# Patient Record
Sex: MALE | Race: WHITE | Employment: FULL TIME | URBAN - METROPOLITAN AREA
[De-identification: names, ages, dates, MRNs, and addresses within clinical notes are randomized per-mention and may not be internally consistent; named-entity substitution may affect disease eponyms.]

---

## 2018-11-21 ENCOUNTER — APPOINTMENT (OUTPATIENT)
Dept: CT IMAGING | Age: 28
DRG: 885 | End: 2018-11-21
Payer: COMMERCIAL

## 2018-11-21 ENCOUNTER — HOSPITAL ENCOUNTER (INPATIENT)
Age: 28
LOS: 7 days | Discharge: HOME | DRG: 885 | End: 2018-11-28
Attending: EMERGENCY MEDICINE | Admitting: PSYCHIATRY & NEUROLOGY
Payer: COMMERCIAL

## 2018-11-21 DIAGNOSIS — F31.13 BIPOLAR AFFECTIVE DISORDER, MANIC, SEVERE (HCC): ICD-10-CM

## 2018-11-21 DIAGNOSIS — F23 ACUTE PSYCHOSIS (HCC): Primary | ICD-10-CM

## 2018-11-21 PROBLEM — F29 PSYCHOSIS ASSOCIATED WITH INTENSIVE CARE (HCC): Status: ACTIVE | Noted: 2018-11-21

## 2018-11-21 LAB
A/G RATIO: 1.8 (ref 1.1–2.2)
ACETAMINOPHEN LEVEL: <5 UG/ML (ref 10–30)
ALBUMIN SERPL-MCNC: 4.9 G/DL (ref 3.4–5)
ALP BLD-CCNC: 96 U/L (ref 40–129)
ALT SERPL-CCNC: 25 U/L (ref 10–40)
AMPHETAMINE SCREEN, URINE: NORMAL
ANION GAP SERPL CALCULATED.3IONS-SCNC: 14 MMOL/L (ref 3–16)
AST SERPL-CCNC: 26 U/L (ref 15–37)
BARBITURATE SCREEN URINE: NORMAL
BASOPHILS ABSOLUTE: 0.1 K/UL (ref 0–0.2)
BASOPHILS RELATIVE PERCENT: 0.7 %
BENZODIAZEPINE SCREEN, URINE: NORMAL
BILIRUB SERPL-MCNC: 0.8 MG/DL (ref 0–1)
BILIRUBIN URINE: NEGATIVE
BLOOD, URINE: NEGATIVE
BUN BLDV-MCNC: 9 MG/DL (ref 7–20)
CALCIUM SERPL-MCNC: 10.1 MG/DL (ref 8.3–10.6)
CANNABINOID SCREEN URINE: NORMAL
CHLORIDE BLD-SCNC: 98 MMOL/L (ref 99–110)
CLARITY: CLEAR
CO2: 25 MMOL/L (ref 21–32)
COCAINE METABOLITE SCREEN URINE: NORMAL
COLOR: YELLOW
CREAT SERPL-MCNC: 0.9 MG/DL (ref 0.9–1.3)
EOSINOPHILS ABSOLUTE: 0 K/UL (ref 0–0.6)
EOSINOPHILS RELATIVE PERCENT: 0.3 %
ETHANOL: NORMAL MG/DL (ref 0–0.08)
GFR AFRICAN AMERICAN: >60
GFR NON-AFRICAN AMERICAN: >60
GLOBULIN: 2.7 G/DL
GLUCOSE BLD-MCNC: 92 MG/DL (ref 70–99)
GLUCOSE URINE: NEGATIVE MG/DL
HCT VFR BLD CALC: 44.6 % (ref 40.5–52.5)
HEMOGLOBIN: 15.4 G/DL (ref 13.5–17.5)
KETONES, URINE: >=80 MG/DL
LEUKOCYTE ESTERASE, URINE: NEGATIVE
LYMPHOCYTES ABSOLUTE: 3.2 K/UL (ref 1–5.1)
LYMPHOCYTES RELATIVE PERCENT: 24.7 %
Lab: NORMAL
MCH RBC QN AUTO: 30.2 PG (ref 26–34)
MCHC RBC AUTO-ENTMCNC: 34.4 G/DL (ref 31–36)
MCV RBC AUTO: 87.9 FL (ref 80–100)
METHADONE SCREEN, URINE: NORMAL
MICROSCOPIC EXAMINATION: ABNORMAL
MONOCYTES ABSOLUTE: 1.1 K/UL (ref 0–1.3)
MONOCYTES RELATIVE PERCENT: 8.2 %
NEUTROPHILS ABSOLUTE: 8.6 K/UL (ref 1.7–7.7)
NEUTROPHILS RELATIVE PERCENT: 66.1 %
NITRITE, URINE: NEGATIVE
OPIATE SCREEN URINE: NORMAL
OXYCODONE URINE: NORMAL
PDW BLD-RTO: 13.2 % (ref 12.4–15.4)
PH UA: 7
PH UA: 7
PHENCYCLIDINE SCREEN URINE: NORMAL
PLATELET # BLD: 256 K/UL (ref 135–450)
PMV BLD AUTO: 9.4 FL (ref 5–10.5)
POTASSIUM SERPL-SCNC: 4.1 MMOL/L (ref 3.5–5.1)
PROPOXYPHENE SCREEN: NORMAL
PROTEIN UA: NEGATIVE MG/DL
RBC # BLD: 5.08 M/UL (ref 4.2–5.9)
SALICYLATE, SERUM: 0.5 MG/DL (ref 15–30)
SODIUM BLD-SCNC: 137 MMOL/L (ref 136–145)
SPECIFIC GRAVITY UA: <=1.005
TOTAL PROTEIN: 7.6 G/DL (ref 6.4–8.2)
URINE REFLEX TO CULTURE: ABNORMAL
URINE TYPE: ABNORMAL
UROBILINOGEN, URINE: 0.2 E.U./DL
WBC # BLD: 13 K/UL (ref 4–11)

## 2018-11-21 PROCEDURE — 6360000002 HC RX W HCPCS: Performed by: EMERGENCY MEDICINE

## 2018-11-21 PROCEDURE — G0480 DRUG TEST DEF 1-7 CLASSES: HCPCS

## 2018-11-21 PROCEDURE — 99285 EMERGENCY DEPT VISIT HI MDM: CPT

## 2018-11-21 PROCEDURE — 81003 URINALYSIS AUTO W/O SCOPE: CPT

## 2018-11-21 PROCEDURE — 6370000000 HC RX 637 (ALT 250 FOR IP): Performed by: EMERGENCY MEDICINE

## 2018-11-21 PROCEDURE — 1240000000 HC EMOTIONAL WELLNESS R&B

## 2018-11-21 PROCEDURE — 80053 COMPREHEN METABOLIC PANEL: CPT

## 2018-11-21 PROCEDURE — 85025 COMPLETE CBC W/AUTO DIFF WBC: CPT

## 2018-11-21 PROCEDURE — 70450 CT HEAD/BRAIN W/O DYE: CPT

## 2018-11-21 PROCEDURE — 80307 DRUG TEST PRSMV CHEM ANLYZR: CPT

## 2018-11-21 RX ORDER — LORAZEPAM 2 MG/ML
2 INJECTION INTRAMUSCULAR ONCE
Status: COMPLETED | OUTPATIENT
Start: 2018-11-21 | End: 2018-11-21

## 2018-11-21 RX ORDER — TRAZODONE HYDROCHLORIDE 50 MG/1
50 TABLET ORAL NIGHTLY PRN
Status: DISCONTINUED | OUTPATIENT
Start: 2018-11-21 | End: 2018-11-28 | Stop reason: HOSPADM

## 2018-11-21 RX ORDER — NICOTINE 21 MG/24HR
1 PATCH, TRANSDERMAL 24 HOURS TRANSDERMAL DAILY
Status: DISCONTINUED | OUTPATIENT
Start: 2018-11-21 | End: 2018-11-21

## 2018-11-21 RX ORDER — BISACODYL 10 MG
10 SUPPOSITORY, RECTAL RECTAL DAILY PRN
Status: DISCONTINUED | OUTPATIENT
Start: 2018-11-21 | End: 2018-11-28 | Stop reason: HOSPADM

## 2018-11-21 RX ORDER — IBUPROFEN 400 MG/1
400 TABLET ORAL EVERY 6 HOURS PRN
Status: DISCONTINUED | OUTPATIENT
Start: 2018-11-21 | End: 2018-11-28 | Stop reason: HOSPADM

## 2018-11-21 RX ORDER — OLANZAPINE 5 MG/1
5 TABLET, ORALLY DISINTEGRATING ORAL NIGHTLY
Status: DISCONTINUED | OUTPATIENT
Start: 2018-11-21 | End: 2018-11-21

## 2018-11-21 RX ORDER — OLANZAPINE 5 MG/1
10 TABLET, ORALLY DISINTEGRATING ORAL ONCE
Status: COMPLETED | OUTPATIENT
Start: 2018-11-21 | End: 2018-11-21

## 2018-11-21 RX ORDER — NICOTINE 21 MG/24HR
1 PATCH, TRANSDERMAL 24 HOURS TRANSDERMAL DAILY
Status: DISCONTINUED | OUTPATIENT
Start: 2018-11-22 | End: 2018-11-28 | Stop reason: HOSPADM

## 2018-11-21 RX ORDER — ACETAMINOPHEN 325 MG/1
650 TABLET ORAL EVERY 4 HOURS PRN
Status: DISCONTINUED | OUTPATIENT
Start: 2018-11-21 | End: 2018-11-28 | Stop reason: HOSPADM

## 2018-11-21 RX ORDER — HYDROXYZINE PAMOATE 50 MG/1
50 CAPSULE ORAL 3 TIMES DAILY PRN
Status: DISCONTINUED | OUTPATIENT
Start: 2018-11-21 | End: 2018-11-28 | Stop reason: HOSPADM

## 2018-11-21 RX ORDER — HALOPERIDOL 5 MG/ML
5 INJECTION INTRAMUSCULAR EVERY 6 HOURS PRN
Status: DISCONTINUED | OUTPATIENT
Start: 2018-11-21 | End: 2018-11-23

## 2018-11-21 RX ORDER — MAGNESIUM HYDROXIDE/ALUMINUM HYDROXICE/SIMETHICONE 120; 1200; 1200 MG/30ML; MG/30ML; MG/30ML
30 SUSPENSION ORAL EVERY 6 HOURS PRN
Status: DISCONTINUED | OUTPATIENT
Start: 2018-11-21 | End: 2018-11-28 | Stop reason: HOSPADM

## 2018-11-21 RX ADMIN — OLANZAPINE 10 MG: 5 TABLET, ORALLY DISINTEGRATING ORAL at 18:34

## 2018-11-21 RX ADMIN — LORAZEPAM 2 MG: 2 INJECTION INTRAMUSCULAR; INTRAVENOUS at 19:56

## 2018-11-21 ASSESSMENT — SLEEP AND FATIGUE QUESTIONNAIRES
DIFFICULTY FALLING ASLEEP: YES
DO YOU USE A SLEEP AID: NO
DO YOU HAVE DIFFICULTY SLEEPING: YES
DIFFICULTY ARISING: NO
RESTFUL SLEEP: NO
DIFFICULTY STAYING ASLEEP: YES
SLEEP PATTERN: DIFFICULTY FALLING ASLEEP;DISTURBED/INTERRUPTED SLEEP
AVERAGE NUMBER OF SLEEP HOURS: 1

## 2018-11-21 NOTE — ED PROVIDER NOTES
Physician who either signs orCo-signs this chart in the absence of a cardiologist.  Please see their note for interpretation of EKG. RADIOLOGY:   Non-plain film images such as CT, Ultrasound and MRI are read by the radiologist. Cedric Cherie radiographic images are visualized andpreliminarily interpreted by the  ED Provider with the below findings:        Interpretation perthe Radiologist below, if available at the time of this note:    CT Head WO Contrast   Final Result   No acute intracranial abnormality. Ct Head Wo Contrast    Result Date: 11/21/2018  EXAMINATION: CT OF THE HEAD WITHOUT CONTRAST  11/21/2018 3:04 pm TECHNIQUE: CT of the head was performed without the administration of intravenous contrast. Dose modulation, iterative reconstruction, and/or weight based adjustment of the mA/kV was utilized to reduce the radiation dose to as low as reasonably achievable. COMPARISON: None. HISTORY: ORDERING SYSTEM PROVIDED HISTORY: manic, flight of ideas, no psyciatric history TECHNOLOGIST PROVIDED HISTORY: If patient is on cardiac monitor and/or pulse ox, they may be taken off cardiac monitor and pulse ox, left on O2 if currently on. All monitors reattached when patient returns to room. Has a \"code stroke\" or \"stroke alert\" been called? ->No Ordering Physician Provided Reason for Exam: manic, flight of ideas FINDINGS: BRAIN/VENTRICLES: There is no acute intracranial hemorrhage, mass effect or midline shift. No abnormal extra-axial fluid collection. The gray-white differentiation is maintained without evidence of an acute infarct. There is no evidence of hydrocephalus. ORBITS: The visualized portion of the orbits demonstrate no acute abnormality. SINUSES: The visualized paranasal sinuses and mastoid air cells demonstrate no acute abnormality. SOFT TISSUES/SKULL:  No acute abnormality of the visualized skull or soft tissues. No acute intracranial abnormality.          PROCEDURES   Unless otherwise noted

## 2018-11-22 PROBLEM — Z72.0 TOBACCO ABUSE: Status: ACTIVE | Noted: 2018-11-22

## 2018-11-22 PROBLEM — F31.13 BIPOLAR AFFECTIVE DISORDER, MANIC, SEVERE (HCC): Status: ACTIVE | Noted: 2018-11-21

## 2018-11-22 PROBLEM — D72.829 LEUCOCYTOSIS: Status: ACTIVE | Noted: 2018-11-22

## 2018-11-22 PROBLEM — F23 ACUTE PSYCHOSIS (HCC): Status: ACTIVE | Noted: 2018-11-21

## 2018-11-22 PROBLEM — F12.10 CANNABIS ABUSE: Status: ACTIVE | Noted: 2018-11-22

## 2018-11-22 PROCEDURE — 99222 1ST HOSP IP/OBS MODERATE 55: CPT | Performed by: PHYSICIAN ASSISTANT

## 2018-11-22 PROCEDURE — 90792 PSYCH DIAG EVAL W/MED SRVCS: CPT | Performed by: NURSE PRACTITIONER

## 2018-11-22 PROCEDURE — 6370000000 HC RX 637 (ALT 250 FOR IP): Performed by: NURSE PRACTITIONER

## 2018-11-22 PROCEDURE — 6370000000 HC RX 637 (ALT 250 FOR IP): Performed by: PSYCHIATRY & NEUROLOGY

## 2018-11-22 PROCEDURE — 1240000000 HC EMOTIONAL WELLNESS R&B

## 2018-11-22 RX ORDER — DIVALPROEX SODIUM 500 MG/1
500 TABLET, EXTENDED RELEASE ORAL NIGHTLY
Status: DISCONTINUED | OUTPATIENT
Start: 2018-11-22 | End: 2018-11-26

## 2018-11-22 RX ORDER — OLANZAPINE 5 MG/1
5 TABLET, ORALLY DISINTEGRATING ORAL 2 TIMES DAILY PRN
Status: DISCONTINUED | OUTPATIENT
Start: 2018-11-22 | End: 2018-11-23

## 2018-11-22 RX ORDER — OLANZAPINE 5 MG/1
10 TABLET, ORALLY DISINTEGRATING ORAL NIGHTLY
Status: DISCONTINUED | OUTPATIENT
Start: 2018-11-22 | End: 2018-11-27

## 2018-11-22 RX ADMIN — NICOTINE POLACRILEX 2 MG: 2 GUM, CHEWING BUCCAL at 11:35

## 2018-11-22 RX ADMIN — OLANZAPINE 5 MG: 5 TABLET, ORALLY DISINTEGRATING ORAL at 23:33

## 2018-11-22 RX ADMIN — OLANZAPINE 10 MG: 5 TABLET, ORALLY DISINTEGRATING ORAL at 19:47

## 2018-11-22 RX ADMIN — NICOTINE POLACRILEX 2 MG: 2 GUM, CHEWING BUCCAL at 16:17

## 2018-11-22 RX ADMIN — NICOTINE POLACRILEX 2 MG: 2 GUM, CHEWING BUCCAL at 14:39

## 2018-11-22 RX ADMIN — DIVALPROEX SODIUM 500 MG: 500 TABLET, EXTENDED RELEASE ORAL at 19:45

## 2018-11-22 RX ADMIN — NICOTINE POLACRILEX 2 MG: 2 GUM, CHEWING BUCCAL at 23:36

## 2018-11-22 NOTE — BH NOTE
This writer spoke with mother of pt for collateral. Mother states that pt has been very driven at work and is the  of the [facility], [facility] [address] [place] [address]. Pt recently returned from a conference where he was presented with 3 awards for his work. Pt's girlfriend told mother that pt has seemed \"manic\" since his return, and has not been sleeping. Pt had to leave work due to altered mental status. Mother describes his son as very driven and goal oriented, and this is not like him. Mother states that gf did not reject proposal but told pt that they would talk about it once he was better.

## 2018-11-22 NOTE — H&P
disorder         Scheduled Meds   OLANZapine zydis  10 mg Oral Nightly    divalproex  500 mg Oral Nightly    nicotine  1 patch Transdermal Daily       PRN Meds  OLANZapine zydis, acetaminophen, ibuprofen, hydrOXYzine, haloperidol lactate, traZODone, magnesium hydroxide, bisacodyl, aluminum & magnesium hydroxide-simethicone, nicotine polacrilex    OBJECTIVE  Vital Signs:  Vitals:    11/21/18 2020   Pulse: 78   Resp: 15   SpO2:        Labs:  Recent Results (from the past 24 hour(s))   CBC Auto Differential    Collection Time: 11/21/18  3:14 PM   Result Value Ref Range    WBC 13.0 (H) 4.0 - 11.0 K/uL    RBC 5.08 4.20 - 5.90 M/uL    Hemoglobin 15.4 13.5 - 17.5 g/dL    Hematocrit 44.6 40.5 - 52.5 %    MCV 87.9 80.0 - 100.0 fL    MCH 30.2 26.0 - 34.0 pg    MCHC 34.4 31.0 - 36.0 g/dL    RDW 13.2 12.4 - 15.4 %    Platelets 550 989 - 629 K/uL    MPV 9.4 5.0 - 10.5 fL    Neutrophils % 66.1 %    Lymphocytes % 24.7 %    Monocytes % 8.2 %    Eosinophils % 0.3 %    Basophils % 0.7 %    Neutrophils # 8.6 (H) 1.7 - 7.7 K/uL    Lymphocytes # 3.2 1.0 - 5.1 K/uL    Monocytes # 1.1 0.0 - 1.3 K/uL    Eosinophils # 0.0 0.0 - 0.6 K/uL    Basophils # 0.1 0.0 - 0.2 K/uL   Comprehensive Metabolic Panel    Collection Time: 11/21/18  3:14 PM   Result Value Ref Range    Sodium 137 136 - 145 mmol/L    Potassium 4.1 3.5 - 5.1 mmol/L    Chloride 98 (L) 99 - 110 mmol/L    CO2 25 21 - 32 mmol/L    Anion Gap 14 3 - 16    Glucose 92 70 - 99 mg/dL    BUN 9 7 - 20 mg/dL    CREATININE 0.9 0.9 - 1.3 mg/dL    GFR Non-African American >60 >60    GFR African American >60 >60    Calcium 10.1 8.3 - 10.6 mg/dL    Total Protein 7.6 6.4 - 8.2 g/dL    Alb 4.9 3.4 - 5.0 g/dL    Albumin/Globulin Ratio 1.8 1.1 - 2.2    Total Bilirubin 0.8 0.0 - 1.0 mg/dL    Alkaline Phosphatase 96 40 - 129 U/L    ALT 25 10 - 40 U/L    AST 26 15 - 37 U/L    Globulin 2.7 g/dL   Acetaminophen Level    Collection Time: 11/21/18  3:14 PM   Result Value Ref Range    Acetaminophen Level

## 2018-11-23 PROCEDURE — 6370000000 HC RX 637 (ALT 250 FOR IP): Performed by: NURSE PRACTITIONER

## 2018-11-23 PROCEDURE — 6370000000 HC RX 637 (ALT 250 FOR IP): Performed by: PSYCHIATRY & NEUROLOGY

## 2018-11-23 PROCEDURE — 93005 ELECTROCARDIOGRAM TRACING: CPT | Performed by: PSYCHIATRY & NEUROLOGY

## 2018-11-23 PROCEDURE — 1240000000 HC EMOTIONAL WELLNESS R&B

## 2018-11-23 PROCEDURE — 6360000002 HC RX W HCPCS: Performed by: PSYCHIATRY & NEUROLOGY

## 2018-11-23 RX ORDER — BENZTROPINE MESYLATE 1 MG/ML
1 INJECTION INTRAMUSCULAR; INTRAVENOUS EVERY 6 HOURS PRN
Status: DISCONTINUED | OUTPATIENT
Start: 2018-11-23 | End: 2018-11-28 | Stop reason: HOSPADM

## 2018-11-23 RX ORDER — HALOPERIDOL 5 MG/ML
10 INJECTION INTRAMUSCULAR EVERY 6 HOURS PRN
Status: DISCONTINUED | OUTPATIENT
Start: 2018-11-23 | End: 2018-11-28 | Stop reason: HOSPADM

## 2018-11-23 RX ORDER — OLANZAPINE 10 MG/1
10 TABLET ORAL EVERY 6 HOURS PRN
Status: DISCONTINUED | OUTPATIENT
Start: 2018-11-23 | End: 2018-11-28 | Stop reason: HOSPADM

## 2018-11-23 RX ORDER — OLANZAPINE 5 MG/1
5 TABLET ORAL ONCE
Status: COMPLETED | OUTPATIENT
Start: 2018-11-23 | End: 2018-11-23

## 2018-11-23 RX ORDER — LORAZEPAM 2 MG/ML
2 INJECTION INTRAMUSCULAR EVERY 6 HOURS PRN
Status: DISCONTINUED | OUTPATIENT
Start: 2018-11-23 | End: 2018-11-28 | Stop reason: HOSPADM

## 2018-11-23 RX ADMIN — NICOTINE POLACRILEX 2 MG: 2 GUM, CHEWING BUCCAL at 16:36

## 2018-11-23 RX ADMIN — HALOPERIDOL LACTATE 5 MG: 5 INJECTION, SOLUTION INTRAMUSCULAR at 00:05

## 2018-11-23 RX ADMIN — DIVALPROEX SODIUM 500 MG: 500 TABLET, EXTENDED RELEASE ORAL at 20:05

## 2018-11-23 RX ADMIN — NICOTINE POLACRILEX 2 MG: 2 GUM, CHEWING BUCCAL at 11:06

## 2018-11-23 RX ADMIN — NICOTINE POLACRILEX 2 MG: 2 GUM, CHEWING BUCCAL at 19:38

## 2018-11-23 RX ADMIN — OLANZAPINE 10 MG: 10 TABLET, FILM COATED ORAL at 22:24

## 2018-11-23 RX ADMIN — OLANZAPINE 5 MG: 5 TABLET, ORALLY DISINTEGRATING ORAL at 10:38

## 2018-11-23 RX ADMIN — HYDROXYZINE PAMOATE 50 MG: 50 CAPSULE ORAL at 15:32

## 2018-11-23 RX ADMIN — HYDROXYZINE PAMOATE 50 MG: 50 CAPSULE ORAL at 20:05

## 2018-11-23 RX ADMIN — NICOTINE POLACRILEX 2 MG: 2 GUM, CHEWING BUCCAL at 23:08

## 2018-11-23 RX ADMIN — OLANZAPINE 10 MG: 5 TABLET, ORALLY DISINTEGRATING ORAL at 20:05

## 2018-11-23 RX ADMIN — OLANZAPINE 5 MG: 5 TABLET, FILM COATED ORAL at 11:30

## 2018-11-23 ASSESSMENT — SLEEP AND FATIGUE QUESTIONNAIRES
DIFFICULTY STAYING ASLEEP: YES
DO YOU HAVE DIFFICULTY SLEEPING: YES
DIFFICULTY ARISING: YES
DIFFICULTY FALLING ASLEEP: YES
SLEEP PATTERN: DIFFICULTY FALLING ASLEEP;DISTURBED/INTERRUPTED SLEEP;INSOMNIA;RESTLESSNESS
AVERAGE NUMBER OF SLEEP HOURS: 5
RESTFUL SLEEP: NO
DO YOU USE A SLEEP AID: YES

## 2018-11-23 ASSESSMENT — LIFESTYLE VARIABLES: HISTORY_ALCOHOL_USE: YES

## 2018-11-23 ASSESSMENT — PATIENT HEALTH QUESTIONNAIRE - PHQ9: SUM OF ALL RESPONSES TO PHQ QUESTIONS 1-9: 13

## 2018-11-23 NOTE — BH NOTE
Pt continues to intrude into another peer's personal space despite having the peer herself respectfully tell him to stay away from her and allow her her personal space. Pt continues to get agitated, is now cursing at staff, crying, stating that he is not sleeping another night on unit. Pt will no longer redirect from going room to room and writing on pt's room signs. When writer attempts to redirect pt, pt puts his hand up to writer and states \"I know that I am not suppose to do this\" yet continued behavior. Will continue to monitor.

## 2018-11-23 NOTE — PROGRESS NOTES
Time: [id number]      Type of Group: relaxation & recreation      Level of Participation: did not stay for all of group.

## 2018-11-23 NOTE — BH NOTE
Patient approached desk tapping his chest stating that he was having chest pain. He stated \" I need an EKG\". Order obtained from dr Sylvia Jiménez. Vital signs evaluated by charge nurse.

## 2018-11-24 LAB
EKG ATRIAL RATE: 81 BPM
EKG DIAGNOSIS: NORMAL
EKG P AXIS: 45 DEGREES
EKG P-R INTERVAL: 144 MS
EKG Q-T INTERVAL: 370 MS
EKG QRS DURATION: 90 MS
EKG QTC CALCULATION (BAZETT): 429 MS
EKG R AXIS: 46 DEGREES
EKG T AXIS: 17 DEGREES
EKG VENTRICULAR RATE: 81 BPM

## 2018-11-24 PROCEDURE — 93010 ELECTROCARDIOGRAM REPORT: CPT | Performed by: INTERNAL MEDICINE

## 2018-11-24 PROCEDURE — 6370000000 HC RX 637 (ALT 250 FOR IP): Performed by: PSYCHIATRY & NEUROLOGY

## 2018-11-24 PROCEDURE — 99233 SBSQ HOSP IP/OBS HIGH 50: CPT | Performed by: PSYCHIATRY & NEUROLOGY

## 2018-11-24 PROCEDURE — 6370000000 HC RX 637 (ALT 250 FOR IP): Performed by: NURSE PRACTITIONER

## 2018-11-24 PROCEDURE — 1240000000 HC EMOTIONAL WELLNESS R&B

## 2018-11-24 RX ADMIN — NICOTINE POLACRILEX 2 MG: 2 GUM, CHEWING BUCCAL at 15:50

## 2018-11-24 RX ADMIN — OLANZAPINE 10 MG: 10 TABLET, FILM COATED ORAL at 18:32

## 2018-11-24 RX ADMIN — OLANZAPINE 10 MG: 10 TABLET, FILM COATED ORAL at 09:16

## 2018-11-24 RX ADMIN — HYDROXYZINE PAMOATE 50 MG: 50 CAPSULE ORAL at 16:13

## 2018-11-24 RX ADMIN — NICOTINE POLACRILEX 2 MG: 2 GUM, CHEWING BUCCAL at 18:19

## 2018-11-24 RX ADMIN — HYDROXYZINE PAMOATE 50 MG: 50 CAPSULE ORAL at 09:16

## 2018-11-24 RX ADMIN — NICOTINE POLACRILEX 2 MG: 2 GUM, CHEWING BUCCAL at 09:16

## 2018-11-24 NOTE — BH NOTE
Client is extremely intense at times. Reports significant irritability. Client is short with staff, poor eye contact. Evasive.  Walks off while writer is attempting to talk with client     PRN medications given to client: zyprexa 10 mg

## 2018-11-24 NOTE — PLAN OF CARE
Problem: Altered Mood, Manic Behavior:  Goal: Able to verbalize decrease in frequency and intensity of racing thoughts  Able to verbalize decrease in frequency and intensity of racing thoughts   Outcome: Ongoing                                                                      Group Therapy Note    Date: 11/24/2018  Start Time: 1:30pm  End Time:  3:00pm  Number of Participants: 8    Type of Group: Cognitive Skills    Patient's Goal:  To learn about the use of guided imagery to increase self-management of emotions. Notes:  Pt attempted several times to remain in group, but was disruptive and intrusive while others were trying to focus and speak. Pt left group.     Status After Intervention:  Unchanged    Participation Level: Minimal    Participation Quality: Inappropriate, Intrusive and Resistant      Speech:  pressured      Thought Process/Content: Perseverating      Affective Functioning: Flat      Mood: anxious and irritable      Level of consciousness:  Alert and Inattentive      Response to Learning: Resistant      Endings: None Reported    Modes of Intervention: Education, Support, Socialization, Exploration and Activity      Discipline Responsible: /Counselor      Signature:  RANDY Rob

## 2018-11-25 LAB
EKG ATRIAL RATE: 63 BPM
EKG DIAGNOSIS: NORMAL
EKG P AXIS: 32 DEGREES
EKG P-R INTERVAL: 142 MS
EKG Q-T INTERVAL: 386 MS
EKG QRS DURATION: 90 MS
EKG QTC CALCULATION (BAZETT): 395 MS
EKG R AXIS: 61 DEGREES
EKG T AXIS: 22 DEGREES
EKG VENTRICULAR RATE: 63 BPM
TOTAL CK: 164 U/L (ref 39–308)
TROPONIN: <0.01 NG/ML

## 2018-11-25 PROCEDURE — 36415 COLL VENOUS BLD VENIPUNCTURE: CPT

## 2018-11-25 PROCEDURE — 1240000000 HC EMOTIONAL WELLNESS R&B

## 2018-11-25 PROCEDURE — 6370000000 HC RX 637 (ALT 250 FOR IP): Performed by: NURSE PRACTITIONER

## 2018-11-25 PROCEDURE — 93005 ELECTROCARDIOGRAM TRACING: CPT | Performed by: PSYCHIATRY & NEUROLOGY

## 2018-11-25 PROCEDURE — 93010 ELECTROCARDIOGRAM REPORT: CPT | Performed by: INTERNAL MEDICINE

## 2018-11-25 PROCEDURE — 84484 ASSAY OF TROPONIN QUANT: CPT

## 2018-11-25 PROCEDURE — 6370000000 HC RX 637 (ALT 250 FOR IP): Performed by: PSYCHIATRY & NEUROLOGY

## 2018-11-25 PROCEDURE — 82550 ASSAY OF CK (CPK): CPT

## 2018-11-25 RX ORDER — LORAZEPAM 2 MG/1
2 TABLET ORAL ONCE
Status: DISCONTINUED | OUTPATIENT
Start: 2018-11-25 | End: 2018-11-25

## 2018-11-25 RX ORDER — LORAZEPAM 1 MG/1
TABLET ORAL
Status: DISPENSED
Start: 2018-11-25 | End: 2018-11-25

## 2018-11-25 RX ADMIN — HYDROXYZINE PAMOATE 50 MG: 50 CAPSULE ORAL at 09:32

## 2018-11-25 RX ADMIN — LORAZEPAM 2 MG: 2 TABLET ORAL at 10:45

## 2018-11-25 RX ADMIN — OLANZAPINE 10 MG: 5 TABLET, ORALLY DISINTEGRATING ORAL at 20:27

## 2018-11-25 RX ADMIN — NICOTINE POLACRILEX 2 MG: 2 GUM, CHEWING BUCCAL at 20:28

## 2018-11-25 RX ADMIN — TRAZODONE HYDROCHLORIDE 50 MG: 50 TABLET ORAL at 21:33

## 2018-11-25 RX ADMIN — NICOTINE POLACRILEX 2 MG: 2 GUM, CHEWING BUCCAL at 12:47

## 2018-11-25 RX ADMIN — DIVALPROEX SODIUM 500 MG: 500 TABLET, EXTENDED RELEASE ORAL at 20:27

## 2018-11-25 ASSESSMENT — ENCOUNTER SYMPTOMS
NAUSEA: 0
ABDOMINAL PAIN: 0
BACK PAIN: 0
SHORTNESS OF BREATH: 0
CHEST TIGHTNESS: 1
DIARRHEA: 0
CONSTIPATION: 0
SORE THROAT: 0

## 2018-11-25 NOTE — BH NOTE
Holding chest \"boy I can feel that\", \"that arrhythmia, I can feel it\". New order received. Received Ativan 2 mg PO for anxiety. Lab here to draw cardiac workup. Increased nervousness \"I hate needles\". Staff at side for assurance. Informed of need for EKG.

## 2018-11-25 NOTE — PLAN OF CARE
Problem: Altered Mood, Manic Behavior:  Goal: Ability to demonstrate self-control will improve  Ability to demonstrate self-control will improve   Outcome: Ongoing                                                                      Group Therapy Note    Date: 11/25/2018  Start Time: 10:45 am  End Time:  12:00 pm  Number of Participants: 8    Type of Group: Psychoeducation    Patient's Goal:  Patient was invited to participate in art therapy group on gratitude. Patients received and reviewed a handout on how to practice and the benefits of practicing gratitude to promote mental health wellness and were invited to create an art piece expressing gratitude in their lives. Notes:  Sander Rizvi entered and exited the room on multiple occassions and when in group, would read the handout on gratitude and engage peers in appropriate conversation. Sander Rizvi appeared more calm and had better self control than had been noted in yesterday's art therapy group. Status After Intervention:  Improved    Participation Level:  Active Listener and Interactive    Participation Quality: Appropriate, Attentive and Sharing      Speech:  pressured and loud      Thought Process/Content: Flight of ideas      Affective Functioning: Constricted/Restricted      Mood: anxious      Level of consciousness:  Alert and Preoccupied      Response to Learning: Able to verbalize current knowledge/experience, Able to verbalize/acknowledge new learning, Able to retain information and Capable of insight      Endings: None Reported    Modes of Intervention: Education, Support, Socialization and Exploration      Discipline Responsible: Psychoeducational Specialist      Signature:  Ki Crystal

## 2018-11-25 NOTE — BH NOTE
Patients grandmother called and was worried about patient and said she was told by patients mother that she and patients father can't visit patient. She stated that she just wanted to know if that's true because she thinks that Tania Montenegro mother is trying to keep this a secret as he is involved in a court lopez regarding his kids with their mother. Jenniffer Luong also reported that patient had a \"bad drug problem\" in the past and spent time as a psychiatric inpatient at a hospital in [place]. The grandmother also reported that she just wants to know if they can visit Ghada Hines, as he has called her and his dad since his admission. Writer informed her that she could not give grandmother any information, but would pass on the information that she gave.

## 2018-11-26 PROCEDURE — 1240000000 HC EMOTIONAL WELLNESS R&B

## 2018-11-26 PROCEDURE — 6370000000 HC RX 637 (ALT 250 FOR IP): Performed by: PSYCHIATRY & NEUROLOGY

## 2018-11-26 PROCEDURE — 99233 SBSQ HOSP IP/OBS HIGH 50: CPT | Performed by: NURSE PRACTITIONER

## 2018-11-26 PROCEDURE — 6370000000 HC RX 637 (ALT 250 FOR IP): Performed by: NURSE PRACTITIONER

## 2018-11-26 RX ORDER — DIVALPROEX SODIUM 500 MG/1
1000 TABLET, EXTENDED RELEASE ORAL NIGHTLY
Status: DISCONTINUED | OUTPATIENT
Start: 2018-11-26 | End: 2018-11-28 | Stop reason: HOSPADM

## 2018-11-26 RX ADMIN — HYDROXYZINE PAMOATE 50 MG: 50 CAPSULE ORAL at 08:43

## 2018-11-26 RX ADMIN — DIVALPROEX SODIUM 1000 MG: 500 TABLET, EXTENDED RELEASE ORAL at 20:12

## 2018-11-26 RX ADMIN — NICOTINE POLACRILEX 2 MG: 2 GUM, CHEWING BUCCAL at 19:26

## 2018-11-26 RX ADMIN — HYDROXYZINE PAMOATE 50 MG: 50 CAPSULE ORAL at 14:46

## 2018-11-26 RX ADMIN — OLANZAPINE 10 MG: 10 TABLET, FILM COATED ORAL at 12:49

## 2018-11-26 RX ADMIN — OLANZAPINE 10 MG: 5 TABLET, ORALLY DISINTEGRATING ORAL at 20:12

## 2018-11-26 NOTE — PROGRESS NOTES
Psychiatric Provider Progress Note    Feliberto Fernandez  [id number]    Hospital Day: 5    Active Problem List  Active Hospital Problems    Diagnosis Date Noted    Tobacco abuse [Z72.0] 11/22/2018    Cannabis abuse [F12.10] 11/22/2018    Leucocytosis [D72.829] 11/22/2018    Acute psychosis ([facility] [F23]     Bipolar affective disorder, manic, severe ([facility] [F31.13] 11/21/2018       Reason for Admission  Context: New Onset of Illness  Location: mood  Duration: 5 days. Severity on Admission: severe  Associated Symptoms on Admission: insomnia, manic, hyperactive, PRINCESS, rapid, pressured speech, disorganization, increased salience, animated, increased goal directed activity at work (won [employer] of the year last week), intense affect  Modifying Factors: proposed to GF and she declined, no hx of mood symptoms    SUBJECTIVE/UPDATE:  Feliberto Fernandez reports that he realizes he may have bipolar and social anxiety. He remains hyperactive and pressured. Sleep has improved and he is more organized that when we last spoke. We discussed medications and he was provided with handouts about the medications. We discussed the importance of remaining on the medications to control symptoms. He remains paranoid about his mother, thinking that she is somehow influencing his GF and his job and controlling parts of his life. Review of Systems  Review of Systems   Psychiatric/Behavioral: The patient is nervous/anxious. All other systems reviewed and are negative.         Social History  Social History     Social History    Marital status: Single     Spouse name: N/A    Number of children: 0    Years of education: N/A     Occupational History     [employer]     Social History Main Topics    Smoking status: Current Every Day Smoker    Smokeless tobacco: Never Used    Alcohol use No    Drug use: Yes     Types: Marijuana    Sexual activity: No     Other Topics Concern    None     Social History Narrative    None       Scheduled oriented [x] mild delusions [] guilt [x] paranoid                               [] hopelessness  [] obsessive [] nonsensical      Delusions: [] none [] grandiose [x] paranoid  [] persecutory  [] somatic         [] Mormon/spiritual     Hallucination Reported: [x] None [] auditory  [] visual  [] olfactory [] tactile  Observed RTIS:         [x] None [] Appears to be distracted by internal stimuli     Suicidal ideation: [x] denies [] endorses [] plan [] intent [] access  Homicidal ideation: [x] denies [] endorses [] plan [] intent [] access     Insight: [] good [] fair [x] poor  [] none  Judgment: [] good [] fair [] poor  [x] impulsive      Attention and concentration: [] intact [] limited [x] impaired     Orientation: [x] person, place, time, situation [] disoriented to:         Memory: Remote []intact [x] impaired                  Recent  []intact [x] impaired     Diagnoses  1. Bipolar, Manic, severe, first episode    2. Cannabis Use   3. Alcohol Use   4.      5.            Plan   Reviewed nursing and ancillary staff notes from last 24 hours. Evaluated medications and assessed for side effects and effectiveness. Assessed patient's educational needs including reviewing Plan of Care,medications and diagnosis.     Requires Inpatient Hospitalization as Least Restrictive Environment: YES     1. Bipolar:  · Medications continued on admission: none  · Day 1: Trial Zyprexa 10 mg HS and Depakote  mg HS   · Day 5: Increase Depakote ER 1000 mg HS; patient education handouts given for all medications  · Multidisciplinary evaluation    · Encourage participation in therapeutic programming and milieu activities    · Monitor for safety    · OT Evaluation/Treat  2. Substance Use. · CIWA - does not appear indicated, will watch closely for any signs of withdrawal  · Substance Abuse Assessment  · Participation in therapeutic programing  · Michele Campos was counseled on the risks of their current alcohol use.   The patient was

## 2018-11-27 PROCEDURE — 6370000000 HC RX 637 (ALT 250 FOR IP): Performed by: PSYCHIATRY & NEUROLOGY

## 2018-11-27 PROCEDURE — 6370000000 HC RX 637 (ALT 250 FOR IP): Performed by: NURSE PRACTITIONER

## 2018-11-27 PROCEDURE — 99233 SBSQ HOSP IP/OBS HIGH 50: CPT | Performed by: NURSE PRACTITIONER

## 2018-11-27 PROCEDURE — 1240000000 HC EMOTIONAL WELLNESS R&B

## 2018-11-27 RX ORDER — OLANZAPINE 5 MG/1
5 TABLET ORAL DAILY
Status: DISCONTINUED | OUTPATIENT
Start: 2018-11-27 | End: 2018-11-28 | Stop reason: HOSPADM

## 2018-11-27 RX ORDER — OLANZAPINE 5 MG/1
15 TABLET, ORALLY DISINTEGRATING ORAL NIGHTLY
Status: DISCONTINUED | OUTPATIENT
Start: 2018-11-27 | End: 2018-11-28 | Stop reason: HOSPADM

## 2018-11-27 RX ORDER — GUAIFENESIN 600 MG/1
600 TABLET, EXTENDED RELEASE ORAL 2 TIMES DAILY
Status: DISCONTINUED | OUTPATIENT
Start: 2018-11-27 | End: 2018-11-28 | Stop reason: HOSPADM

## 2018-11-27 RX ADMIN — OLANZAPINE 5 MG: 5 TABLET, FILM COATED ORAL at 11:11

## 2018-11-27 RX ADMIN — HYDROXYZINE PAMOATE 50 MG: 50 CAPSULE ORAL at 08:26

## 2018-11-27 RX ADMIN — OLANZAPINE 15 MG: 5 TABLET, ORALLY DISINTEGRATING ORAL at 20:07

## 2018-11-27 RX ADMIN — GUAIFENESIN 600 MG: 600 TABLET, EXTENDED RELEASE ORAL at 11:10

## 2018-11-27 RX ADMIN — GUAIFENESIN 600 MG: 600 TABLET, EXTENDED RELEASE ORAL at 20:08

## 2018-11-27 RX ADMIN — DIVALPROEX SODIUM 1000 MG: 500 TABLET, EXTENDED RELEASE ORAL at 20:07

## 2018-11-27 NOTE — PLAN OF CARE
Problem: Altered Mood, Manic Behavior:  Goal: Able to verbalize decrease in frequency and intensity of racing thoughts  Able to verbalize decrease in frequency and intensity of racing thoughts   Outcome: Ongoing  Patient was out with patient group early in the shift & was social. Patient was needy & anxious early in the shift, coming to the team station at intervals, but was pleasant. Patient reported coming to the hospital due to having anxiety, stress & not sleeping.  Champ Cisneros R.N.

## 2018-11-27 NOTE — BH NOTE
Group Therapy Note    Date: 11/27/2018  Start Time: 20:00  End Time:  21:00  Number of Participants: 13      Type of Group: Recreational  Wrap up    Tim Lindsey Information  Module Name:  /  Session Number:  /    Patient's Goal:  That I can't control everything    Notes:  Coping better    Status After Intervention:  Improved    Participation Level:  Active Listener and Interactive    Participation Quality: Appropriate, Attentive and Sharing      Speech:  pressured      Thought Process/Content: Logical      Affective Functioning: Exaggerated      Mood: anxious      Level of consciousness:  Alert, Oriented x4 and Attentive      Response to Learning: Progressing to goal      Endings: None Reported    Modes of Intervention: Socialization and Problem-solving      Discipline Responsible: Behavorial Health Tech      Signature:  Jose Henderson

## 2018-11-27 NOTE — PLAN OF CARE
Problem: Altered Mood, Manic Behavior:  Goal: Able to verbalize decrease in frequency and intensity of racing thoughts  Able to verbalize decrease in frequency and intensity of racing thoughts   Outcome: Ongoing  Pt. Has experienced a decrease in racing thoughts, attended a complete Group session, less demands and requests. Medications  were adjusted and set request limits.

## 2018-11-27 NOTE — PROGRESS NOTES
[facility] Group Nutrition Education Class    Topic: Healthy Eating and Grocery Shopping    Patient attended weekly nutrition class this week. Participation was: good - patient tried to offer a lot of different information about nutrition during class but his information was not always accurate. He would catch himself being overzealous and apologize each time. He left class approximately assisted through and did not return to class. Patient apologized to this RD for leaving class early once the class had ended - he stated that he had a lot of questions and did not want to be disruptive, so that's why he left. RD provided patient with nutrition handouts during class. Patients were/are encouraged to request that [facility] staff contact RD if they have any additional questions and/or concerns about nutrition materials provided in class today.        [address] [place] [address],[address], [address] [facility]

## 2018-11-28 VITALS
WEIGHT: 185 LBS | HEART RATE: 86 BPM | SYSTOLIC BLOOD PRESSURE: 139 MMHG | RESPIRATION RATE: 16 BRPM | TEMPERATURE: 98.1 F | DIASTOLIC BLOOD PRESSURE: 79 MMHG | OXYGEN SATURATION: 99 %

## 2018-11-28 PROCEDURE — 5130000000 HC BRIDGE APPOINTMENT

## 2018-11-28 PROCEDURE — 6370000000 HC RX 637 (ALT 250 FOR IP): Performed by: NURSE PRACTITIONER

## 2018-11-28 PROCEDURE — 6370000000 HC RX 637 (ALT 250 FOR IP): Performed by: PSYCHIATRY & NEUROLOGY

## 2018-11-28 PROCEDURE — 99239 HOSP IP/OBS DSCHRG MGMT >30: CPT | Performed by: NURSE PRACTITIONER

## 2018-11-28 RX ORDER — HYDROXYZINE PAMOATE 50 MG/1
50 CAPSULE ORAL 2 TIMES DAILY PRN
Qty: 60 CAPSULE | Refills: 0 | Status: SHIPPED | OUTPATIENT
Start: 2018-11-28 | End: 2018-12-28

## 2018-11-28 RX ORDER — DIVALPROEX SODIUM 500 MG/1
1000 TABLET, EXTENDED RELEASE ORAL NIGHTLY
Qty: 60 TABLET | Refills: 0 | Status: ON HOLD | OUTPATIENT
Start: 2018-11-28 | End: 2021-08-19 | Stop reason: HOSPADM

## 2018-11-28 RX ORDER — OLANZAPINE 20 MG/1
20 TABLET ORAL NIGHTLY
Qty: 30 TABLET | Refills: 0 | Status: ON HOLD | OUTPATIENT
Start: 2018-11-28 | End: 2021-08-19 | Stop reason: HOSPADM

## 2018-11-28 RX ADMIN — GUAIFENESIN 600 MG: 600 TABLET, EXTENDED RELEASE ORAL at 08:33

## 2018-11-28 RX ADMIN — OLANZAPINE 5 MG: 5 TABLET, FILM COATED ORAL at 08:33

## 2018-11-28 RX ADMIN — HYDROXYZINE PAMOATE 50 MG: 50 CAPSULE ORAL at 08:33

## 2018-11-28 NOTE — DISCHARGE SUMMARY
ng/ml   EKG 12 Lead    Collection Time: 11/23/18  7:08 PM   Result Value Ref Range    Ventricular Rate 81 BPM    Atrial Rate 81 BPM    P-R Interval 144 ms    QRS Duration 90 ms    Q-T Interval 370 ms    QTc Calculation (Bazett) 429 ms    P Axis 45 degrees    R Axis 46 degrees    T Axis 17 degrees    Diagnosis       Normal sinus rhythm with sinus arrhythmiaNonspecific ST abnormalityAbnormal ECGNo previous ECGs availableConfirmed by DAGO LANDEROS MD ([id number]) on 11/24/2018 7:56:52 AM   Troponin    Collection Time: 11/25/18 10:54 AM   Result Value Ref Range    Troponin <0.01 <0.01 ng/mL   CK    Collection Time: 11/25/18 10:54 AM   Result Value Ref Range    Total  39 - 308 U/L   EKG 12 Lead    Collection Time: 11/25/18 11:02 AM   Result Value Ref Range    Ventricular Rate 63 BPM    Atrial Rate 63 BPM    P-R Interval 142 ms    QRS Duration 90 ms    Q-T Interval 386 ms    QTc Calculation (Bazett) 395 ms    P Axis 32 degrees    R Axis 61 degrees    T Axis 22 degrees    Diagnosis       Normal sinus rhythm with sinus arrhythmiaNormal ECGWhen compared with ECG of 23-NOV-2018 19:08,No significant change was foundConfirmed by Philip Dukes MD, [facility] ([id number]) on 11/25/2018 12:01:36 PM         40 Minutes

## 2018-11-28 NOTE — PLAN OF CARE
Problem: Altered Mood, Manic Behavior:  Goal: Ability to demonstrate self-control will improve  Ability to demonstrate self-control will improve   Outcome: Ongoing  Patient was out with patient group & was social at intervals. Patient appeared less needy, than the previous night. Patient stated in 1:1, that he feels 100% improved. \"I feel calm & not manic. \" Patient continued to appear anxious at times.  Daphnie Cisneros R.N.

## 2021-08-15 ENCOUNTER — HOSPITAL ENCOUNTER (INPATIENT)
Age: 31
LOS: 4 days | Discharge: HOME | DRG: 885 | End: 2021-08-19
Attending: PSYCHIATRY & NEUROLOGY | Admitting: PSYCHIATRY & NEUROLOGY
Payer: COMMERCIAL

## 2021-08-15 PROBLEM — F31.2 BIPOLAR I DISORDER, CURRENT OR MOST RECENT EPISODE MANIC, WITH PSYCHOTIC FEATURES (HCC): Status: ACTIVE | Noted: 2021-08-15

## 2021-08-15 PROCEDURE — 1240000000 HC EMOTIONAL WELLNESS R&B

## 2021-08-15 RX ORDER — OLANZAPINE 10 MG/1
10 TABLET ORAL EVERY 8 HOURS PRN
Status: DISCONTINUED | OUTPATIENT
Start: 2021-08-15 | End: 2021-08-16

## 2021-08-15 RX ORDER — ACETAMINOPHEN 325 MG/1
650 TABLET ORAL EVERY 6 HOURS PRN
Status: DISCONTINUED | OUTPATIENT
Start: 2021-08-15 | End: 2021-08-16

## 2021-08-15 RX ORDER — OLANZAPINE 10 MG/1
10 INJECTION, POWDER, LYOPHILIZED, FOR SOLUTION INTRAMUSCULAR EVERY 8 HOURS PRN
Status: DISCONTINUED | OUTPATIENT
Start: 2021-08-15 | End: 2021-08-16

## 2021-08-15 RX ORDER — CHLORAL HYDRATE 500 MG
3000 CAPSULE ORAL 3 TIMES DAILY
Status: ON HOLD | COMMUNITY
End: 2021-08-19 | Stop reason: HOSPADM

## 2021-08-15 ASSESSMENT — LIFESTYLE VARIABLES: HISTORY_ALCOHOL_USE: NO

## 2021-08-15 ASSESSMENT — PAIN SCALES - GENERAL: PAINLEVEL_OUTOF10: 2

## 2021-08-15 ASSESSMENT — SLEEP AND FATIGUE QUESTIONNAIRES
DO YOU HAVE DIFFICULTY SLEEPING: NO
DO YOU USE A SLEEP AID: NO
AVERAGE NUMBER OF SLEEP HOURS: 5

## 2021-08-16 PROBLEM — S90.111A CONTUSION OF RIGHT GREAT TOE WITHOUT DAMAGE TO NAIL: Status: ACTIVE | Noted: 2021-08-16

## 2021-08-16 PROCEDURE — 99221 1ST HOSP IP/OBS SF/LOW 40: CPT | Performed by: PHYSICIAN ASSISTANT

## 2021-08-16 PROCEDURE — 6370000000 HC RX 637 (ALT 250 FOR IP): Performed by: PSYCHIATRY & NEUROLOGY

## 2021-08-16 PROCEDURE — 1240000000 HC EMOTIONAL WELLNESS R&B

## 2021-08-16 PROCEDURE — 99223 1ST HOSP IP/OBS HIGH 75: CPT | Performed by: PSYCHIATRY & NEUROLOGY

## 2021-08-16 RX ORDER — LORAZEPAM 2 MG/1
2 TABLET ORAL EVERY 8 HOURS PRN
Status: DISCONTINUED | OUTPATIENT
Start: 2021-08-16 | End: 2021-08-19 | Stop reason: HOSPADM

## 2021-08-16 RX ORDER — ACETAMINOPHEN 325 MG/1
650 TABLET ORAL EVERY 6 HOURS PRN
Status: DISCONTINUED | OUTPATIENT
Start: 2021-08-16 | End: 2021-08-19 | Stop reason: HOSPADM

## 2021-08-16 RX ORDER — DIMETHICONE, OXYBENZONE, AND PADIMATE O 2; 2.5; 6.6 G/100G; G/100G; G/100G
STICK TOPICAL PRN
Status: DISCONTINUED | OUTPATIENT
Start: 2021-08-16 | End: 2021-08-19 | Stop reason: HOSPADM

## 2021-08-16 RX ORDER — OLANZAPINE 5 MG/1
5 TABLET ORAL 2 TIMES DAILY PRN
Status: DISCONTINUED | OUTPATIENT
Start: 2021-08-16 | End: 2021-08-19 | Stop reason: HOSPADM

## 2021-08-16 RX ORDER — OLANZAPINE 10 MG/1
10 INJECTION, POWDER, LYOPHILIZED, FOR SOLUTION INTRAMUSCULAR EVERY 12 HOURS PRN
Status: DISCONTINUED | OUTPATIENT
Start: 2021-08-16 | End: 2021-08-19 | Stop reason: HOSPADM

## 2021-08-16 RX ORDER — IBUPROFEN 400 MG/1
400 TABLET ORAL EVERY 8 HOURS PRN
Status: DISCONTINUED | OUTPATIENT
Start: 2021-08-16 | End: 2021-08-19 | Stop reason: HOSPADM

## 2021-08-16 RX ORDER — NICOTINE 21 MG/24HR
1 PATCH, TRANSDERMAL 24 HOURS TRANSDERMAL DAILY
Status: DISCONTINUED | OUTPATIENT
Start: 2021-08-16 | End: 2021-08-19 | Stop reason: HOSPADM

## 2021-08-16 RX ADMIN — ACETAMINOPHEN 650 MG: 325 TABLET ORAL at 06:23

## 2021-08-16 RX ADMIN — ACETAMINOPHEN 650 MG: 325 TABLET ORAL at 19:14

## 2021-08-16 RX ADMIN — NICOTINE POLACRILEX 4 MG: 4 GUM, CHEWING BUCCAL at 00:03

## 2021-08-16 RX ADMIN — LORAZEPAM 2 MG: 2 TABLET ORAL at 21:30

## 2021-08-16 RX ADMIN — Medication: at 15:18

## 2021-08-16 RX ADMIN — ACETAMINOPHEN 650 MG: 325 TABLET ORAL at 00:03

## 2021-08-16 RX ADMIN — ACETAMINOPHEN 650 MG: 325 TABLET ORAL at 12:51

## 2021-08-16 RX ADMIN — NICOTINE POLACRILEX 4 MG: 4 GUM, CHEWING BUCCAL at 05:38

## 2021-08-16 RX ADMIN — NICOTINE POLACRILEX 4 MG: 4 GUM, CHEWING BUCCAL at 17:58

## 2021-08-16 RX ADMIN — NICOTINE POLACRILEX 4 MG: 4 GUM, CHEWING BUCCAL at 12:38

## 2021-08-16 RX ADMIN — NICOTINE POLACRILEX 4 MG: 4 GUM, CHEWING BUCCAL at 09:24

## 2021-08-16 RX ADMIN — NICOTINE POLACRILEX 4 MG: 4 GUM, CHEWING BUCCAL at 20:52

## 2021-08-16 ASSESSMENT — PAIN SCALES - GENERAL
PAINLEVEL_OUTOF10: 1
PAINLEVEL_OUTOF10: 2
PAINLEVEL_OUTOF10: 2
PAINLEVEL_OUTOF10: 3

## 2021-08-16 ASSESSMENT — SLEEP AND FATIGUE QUESTIONNAIRES
DO YOU USE A SLEEP AID: NO
DO YOU HAVE DIFFICULTY SLEEPING: NO
AVERAGE NUMBER OF SLEEP HOURS: 5

## 2021-08-16 ASSESSMENT — LIFESTYLE VARIABLES: HISTORY_ALCOHOL_USE: NO

## 2021-08-16 NOTE — PROGRESS NOTES
`[facility] [place]  Admission Note     Admission Type:   Admission Type: Voluntary    Reason for admission:  Reason for Admission: bipolar 1    PATIENT STRENGTHS:  Strengths: Communication, Employment, Social Skills    Patient Strengths and Limitations:  Limitations: General negative or hopeless attitude about future/recovery, Unrealistic self-view    Addictive Behavior:   Addictive Behavior  In the past 3 months, have you felt or has someone told you that you have a problem with:  : None  Do you have a history of Chemical Use?: No  Do you have a history of Alcohol Use?: No  Do you have a history of Street Drug Abuse?: No  Histroy of Prescripton Drug Abuse?: No    Medical Problems:   No past medical history on file.     Status EXAM:  Status and Exam  Normal: No  Facial Expression: Exaggerated, Elevated  Affect: Appropriate, Unstable  Level of Consciousness: Alert  Mood:Normal: No  Mood: Anxious  Motor Activity:Normal: No  Motor Activity: Increased  Interview Behavior: Cooperative  Preception: [place] to Person, Ana Bloch to Time, [place] to Place, [place] to Situation  Attention:Normal: No  Attention: Unable to Concentrate  Thought Processes: Tangential, Flt.of Ideas  Thought Content:Normal: No  Thought Content: Poverty of Content, Preoccupations  Hallucinations: None  Delusions: No  Memory:Normal: No  Memory: Poor Recent, Poor Remote  Insight and Judgment: No  Insight and Judgment: Poor Judgment, Poor Insight  Present Suicidal Ideation: No  Present Homicidal Ideation: No    Tobacco Screening:  Practical Counseling, on admission, evangelina X, if applicable and completed (first 3 are required if patient doesn't refuse):            ( )  Recognizing danger situations (included triggers and roadblocks)                    ( )  Coping skills (new ways to manage stress, exercise, relaxation techniques, changing routine, distraction)                                                           ( )  Basic information about quitting (benefits of quitting, techniques in how to quit, available resources  ( ) Referral for counseling faxed to [facility]                                           ( x) Patient refused counseling  ( ) Patient has not smoked in the last 30 days    Metabolic Screening:    No results found for: LABA1C    No results found for: CHOL  No results found for: TRIG  No results found for: HDL  No components found for: LDLCAL  No results found for: LABVLDL      There is no height or weight on file to calculate BMI. BP Readings from Last 2 Encounters:   08/15/21 139/77   11/28/18 139/79           Pt admitted with followings belongings:  Dentures: None  Vision - Corrective Lenses: None  Hearing Aid: None  Jewelry: None  Body Piercings Removed: No  Clothing: Shirt, Footwear, Pants, Undergarments (Comment)  Other Valuables: Wallet (dip, belt, sunglasses $10 cash)     Valuables in locker. Valuables placed in safe in safe. .  Patient oriented to surroundings and program expectations and copy of patient rights given. Received admission packet. Consents reviewed, signed. Patient verbalize understanding. Patient education on precautions.                    Priti Sherman RN

## 2021-08-16 NOTE — PROGRESS NOTES
Pt arrived to unit via EMS. He is calm and cooperative A&O. Oriented to room and call light. Water pitcher provided. Shower essentials provided and given a safety gown. ELICIA Garay SI. ENEDINAs.s.

## 2021-08-16 NOTE — FLOWSHEET NOTE
08/16/21 1148   Activities of Daily Living   Patient Requires assistance with daily self-care activities? No   Leisure Activity 1   3 Favorite Leisure Activities \"Hunt. \"   Frequency once a year or less  (\"Hunting season hasn't started. I have my fishing license. \")   Last time in the past year   Barriers to participating    (\"When I get time off of work, which is not very often because of work. I'm not leaving my job though becasue I have [employer] 78 to the state of [place], which is child support. \")   Leisure Activity 2   Favorite Leisure Activities  \"Fishing and golfing. \"   Frequency  several times a year   Last time  in the last 3 months   Barriers to participating    (\"Work. \")   Leisure Activity 3   Favorite Leisure Activities  \"Spend time with family and friends. \"   Frequency  weekly   Last time  this week   Barriers to participating    (\"Work. \")   Social   Patient reports spending the majority of their free time alone   Patient verbalizes a preference for spending free time alone   Patients perception of support system healthy/strong   Patients perception of barriers to socializing with others include(s) Other  (Lack of time)   Social Details \"I spend time with my dog and my family and friends when I'm not working. \"   Beliefs & Coping   Has difficulty dealing with feelings   Yes   Internalizes feelings/Keeps feelings in No   Externalizes feelings through aggressiveness or poor temper control  No   Feels uncomfortable around others  No   Has difficulty talking to others  No   Depends on others for direction or decisions No   Difficulty dealing with anger of others  No   Difficulty dealing with own anger  No   Difficulty managing stress Yes   Frequently has difficulty with relationships  Yes   which,who,where in family  (\"Yes, very much so with my ex-wife. \")   Has recently perceived/experienced loss, disappointment, humiliation or failure  NO   General perception about self likes self   Attitude about abilities more successful than not   Locus of Control  most of the time   Belief about recovery Recovery is possible  (\"Emminent threat of recovery. \")   Patient Identified Strengths  \"I've got a doctor's appointment set up for Chuck Kumari, Dr. Haley Vargas, and she was going to help point me in the right direction. \"   Patient Identified Limitations  \"Myself and my ex-wife and my coworkers and my friends and family, but mainly myself, getting in my own way. \"   Perception of most stressful event prior to hospitalization \"I got picked up from a welfare visit. They probably thought I was publically intoxicated and I was well within my legal rights because they just passed the [employer] in [place] and you can be publically intoxicated and walking around until 12:30. I was just down at the [facility] having a cocktail. There might be people there who don't like that I frequent there, but the town is big enough for the both of us. I really just like to keep to myself. \"   Perception of changes needed \"I believe if I were left to my own devices, I would find a doctor and a therapist to talk to and get prescriptions that would work in the long term and short term. \"   Strengths and Limitations   Strengths Independent in basic self-care activities; Positive leisure interests;Demonstrates basic social skills   Limitations Difficulty problem solving/relies on others to help solve problems;Unrealistic self-view     Therapist met with patient and completed Leisure Assessment.

## 2021-08-16 NOTE — H&P
Hospital Medicine History & Physical      PCP: No primary care provider on file. Date of Admission: 8/15/2021    Date of Service: Pt seen/examined on 8/16/2021      History Of Present Illness: The patient is a 33 y.o. male with Bipolar DO who presented to [facility] for psychiatric eval.  Patient was seen and evaluated in the ED by the ED medical provider, patient was medically cleared for admission to [facility]. This note serves as an admission medical H&P. Tobacco use: occasional  ETOH use: yes, he would not quantify  Illicit drug use: denies    Patient denies any medical complaints. I asked him why he had an xray of his right foot in the ER, he then told me he stubbed his toe. The xray was negative for acute abnormality. He would not let me examine his foot     Past Medical History:    No past medical history on file. Past Surgical History:        Procedure Laterality Date    HERNIA REPAIR      child    KNEE SURGERY         Medications Prior to Admission:    Prior to Admission medications    Medication Sig Start Date End Date Taking? Authorizing Provider   Omega-3 Fatty Acids (FISH OIL) 1000 MG CAPS Take 3,000 mg by mouth 3 times daily   Yes Historical Provider, MD   divalproex (DEPAKOTE ER) 500 MG extended release tablet Take 2 tablets by mouth nightly 11/28/18   LEO Coyne CNP   OLANZapine (ZYPREXA) 20 MG tablet Take 1 tablet by mouth nightly 11/28/18   LEO Coyne CNP       Allergies:  Patient has no known allergies. Social History:  The patient currently lives at home. Works at a [employer]:   reports that he has been smoking. He has never used smokeless tobacco.  ETOH:   reports no history of alcohol use. Family History:   Positive as follows:    No family history on file.     REVIEW OF SYSTEMS:       Constitutional: Negative for fever   HENT: Negative for sore throat   Eyes: Negative for redness   Respiratory: Negative  for
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
## 2021-08-16 NOTE — PLAN OF CARE
Problem: Altered Mood, Manic Behavior:  Goal: Able to verbalize decrease in frequency and intensity of racing thoughts  Description: Able to verbalize decrease in frequency and intensity of racing thoughts  8/16/2021 1222 by Ita Encarnacion LPN  Outcome: Ongoing     Problem: Altered Mood, Manic Behavior:  Goal: Ability to disclose and discuss suicidal ideas will improve  Description: Ability to disclose and discuss suicidal ideas will improve  8/16/2021 1222 by Ita Encarnacion LPN  Outcome: Ongoing   Max Guzman is disorganized with pressured speech, irritable at times. Patient is visible on unit and currently denies SI/HI/AVH. Will continue to monitor for safety.

## 2021-08-16 NOTE — BH NOTE
[facility]  Treatment Team Note  Review Date & Time: 8/16/21  0910    Patient was not in treatment team      Status EXAM:   Status and Exam  Normal: No  Facial Expression: Exaggerated  Affect: Unstable  Level of Consciousness: Alert  Mood:Normal: No  Mood: Irritable  Motor Activity:Normal: No  Motor Activity: Increased  Interview Behavior: Impulsive, Irritable  Preception: Gilman to Person, Gilman to Time, Gilman to Place  Attention:Normal: No  Attention: Unable to Concentrate  Thought Processes: Flt.of Ideas, Tangential  Thought Content:Normal: No  Thought Content: Preoccupations  Hallucinations: None  Delusions: No  Memory:Normal: No  Memory: Poor Recent  Insight and Judgment: No  Insight and Judgment: Poor Judgment, Poor Insight  Present Suicidal Ideation: No  Present Homicidal Ideation: No      Suicide Risk CSSR-S:  1) Within the past month, have you wished you were dead or wished you could go to sleep and not wake up? : No  2) Have you actually had any thoughts of killing yourself? : No  6) Have you ever done anything, started to do anything, or prepared to do anything to end your life?: No      PLAN/TREATMENT RECOMMENDATIONS UPDATE: Patient will take medication as prescribed, eat 75% of meals, attend groups, participate in milieu activities, participate in treatment team and care planning for discharge and follow up.

## 2021-08-16 NOTE — PROGRESS NOTES
Pt has pressures speech and tangential responses. He states he was just drinking and having fun he's an adult he can do that. Pt denies all. CFS. Stats has kids he would never leave them that legacy. Also states he is good at everything and he would be dead if he did try. Pt reports not taking any prescribed medication r/t it making him feel like a zombie and reports several natural substances he takes. Over the counter testerone and fish oil amongst a few named. Complains of toe pain stated he stubbed it last week. Imaging done at [facility] and was negative. Prn meds given.
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
## 2021-08-16 NOTE — PROGRESS NOTES
Behavioral Services  Medicare Certification Upon Admission    I certify that this patient's inpatient psychiatric hospital admission is medically necessary for:    [x] (1) Treatment which could reasonably be expected to improve this patient's condition,       [x] (2) Or for diagnostic study;     AND     [x](2) The inpatient psychiatric services are provided while the individual is under the care of a physician and are included in the individualized plan of care.     Estimated length of stay/service 5-8 days    Plan for post-hospital care incomplete     Electronically signed by Anastasia Dandy, MD on 8/16/2021 at 4:27 PM

## 2021-08-17 PROCEDURE — 6370000000 HC RX 637 (ALT 250 FOR IP): Performed by: PSYCHIATRY & NEUROLOGY

## 2021-08-17 PROCEDURE — 99233 SBSQ HOSP IP/OBS HIGH 50: CPT | Performed by: PSYCHIATRY & NEUROLOGY

## 2021-08-17 PROCEDURE — 1240000000 HC EMOTIONAL WELLNESS R&B

## 2021-08-17 RX ORDER — DIVALPROEX SODIUM 500 MG/1
2000 TABLET, EXTENDED RELEASE ORAL DAILY
Status: DISCONTINUED | OUTPATIENT
Start: 2021-08-17 | End: 2021-08-19 | Stop reason: HOSPADM

## 2021-08-17 RX ADMIN — NICOTINE POLACRILEX 4 MG: 4 GUM, CHEWING BUCCAL at 17:37

## 2021-08-17 RX ADMIN — DIVALPROEX SODIUM 2000 MG: 500 TABLET, EXTENDED RELEASE ORAL at 14:39

## 2021-08-17 RX ADMIN — LORAZEPAM 2 MG: 2 TABLET ORAL at 23:16

## 2021-08-17 RX ADMIN — NICOTINE POLACRILEX 4 MG: 4 GUM, CHEWING BUCCAL at 08:58

## 2021-08-17 RX ADMIN — NICOTINE POLACRILEX 4 MG: 4 GUM, CHEWING BUCCAL at 15:44

## 2021-08-17 RX ADMIN — ACETAMINOPHEN 650 MG: 325 TABLET ORAL at 10:31

## 2021-08-17 RX ADMIN — Medication: at 17:52

## 2021-08-17 RX ADMIN — ACETAMINOPHEN 650 MG: 325 TABLET ORAL at 21:23

## 2021-08-17 ASSESSMENT — PAIN SCALES - GENERAL
PAINLEVEL_OUTOF10: 2
PAINLEVEL_OUTOF10: 0
PAINLEVEL_OUTOF10: 2

## 2021-08-17 NOTE — BH NOTE
.[address] Round[address]  [address]        Patient Location: Patient room     Patient willing to engage in conversation: No     Presentation/behavior: sleeping     Affect: sleeping     Concerns reported: earlier c/o anxiety     PRN medications given: Ativan 2 mg po given at 2130 which was effective     Environmental assessment: Room free from clutter, Clear path to bathroom  and Adequate lighting     Fall prevention interventions in place: n/a     Daily [place] Fall Risk Score: 67     Daily [place] Fall Risk Score: 0

## 2021-08-17 NOTE — PROGRESS NOTES
Department of Psychiatry  Progress Note    Patient's chart was reviewed. Discussed with treatment team. Met with patient. SUBJECTIVE:      Remains manic - elevated, pressured, and grandiose. Discussed treatment options (mood stabilizers and antipsychotics) at length and finally agreed to start Depakote - loading dose - for acute stabilization and maintenance. ROS:   Patient has new complaints: no  Sleeping adequately:  No:   Appetite adequate: Yes  Engaged in programming: some    OBJECTIVE:  VITALS:  BP (!) 156/92   Pulse 57   Temp 98.6 °F (37 °C) (Temporal)   Resp 16   SpO2 98%     Mental Status Examination:    Appearance: fair grooming and hygiene  Behavior/Attitude toward examiner:  good eye contact  Speech: pressured  Mood:  \"great\"  Affect:  elevated; labile. Thought processes:  tangential  Thought Content: no SI, no HI, grandiose, some paranoia  Perceptions: no AVH  Attention: attention span and concentration were intact to interview   Abstraction: intact  Cognition:  Alert and oriented to person, place, time, and situation, recall intact  Insight: impaired  Judgment: impaired    Medication:  Scheduled:   nicotine  1 patch Transdermal Daily        PRN:  medicated lip balm, acetaminophen, ibuprofen, LORazepam, OLANZapine, OLANZapine, nicotine polacrilex     FORMULATION:  This is a domiciled, , employed 68-year-old with a  history of bipolar 1 disorder who was brought by police on a statement  of belief to [facility] ED with manic symptoms and behavior. The  patient presents pressured, tangential, grandiose, and elevated. He  meets criteria for bipolar 1 disorder, most recently manic with  psychotic features. Given the severity of his symptoms, he was admitted  for further evaluation and treatment.     DIAGNOSES:  1. Bipolar 1 disorder, severe, most recently manic with psychotic  features. 2.  Right ankle and toe pain. PLAN:  1.   Admitted to Inpatient Psychiatry for stabilization and treatment. 2.  On admission, discussed treatment options at length. He was not  interested in scheduled psychotropics at this point. Ordered q.  15-minute checks for safety, programming, and p.r.n. medication for  anxiety, agitation, and insomnia. 8/17/2021 - Discussed treatment options (mood stabilizers and antipsychotics) again and finally agreed to start Depakote - loading dose - for acute stabilization and maintenance. 3.  Internal Medicine consult for admission. #Right third toe contusion  - xray neg for fx. He denies complaints and declined further exam    4. Estimated length of stay, 5 to 8 days for stabilization. The patient was admitted on a statement of belief but agreed  to stay voluntarily for a short period for stabilization. Total time with patient was 35 minutes and more than 50 % of that time was spent counseling the patient on their symptoms, treatment, and expected goals.      Yeny Webb MD

## 2021-08-17 NOTE — PLAN OF CARE
Problem: Altered Mood, Manic Behavior:  Goal: Able to sleep  Description: Able to sleep  Outcome: Ongoing     Problem: Altered Mood, Manic Behavior:  Goal: Able to verbalize decrease in frequency and intensity of racing thoughts  Description: Able to verbalize decrease in frequency and intensity of racing thoughts  8/16/2021 2228 by Miesha Bonilla RN  Outcome: Ongoing     Problem: Altered Mood, Manic Behavior:  Goal: Ability to disclose and discuss suicidal ideas will improve  Description: Ability to disclose and discuss suicidal ideas will improve  8/16/2021 2228 by Miesha Bonilla RN  Outcome: Ongoing   Pt has been out on the unit off and on. He is verbal at times but is able to make needs known. Pt has had only slight irritability but generally has been cooperative. Ate a small snack. Pt is preoccupied at times. Sometimes needy. He feels that he is ready to go home. Pt given Ativan 2 mg po at 2130 for anxiety and asked the nurse several times about the dosage and name of the medication. Pt also was putting a book on his door knob on the inside of his room, so that when staff made rounds, and if any one came in, he would hear it. 2230  Pt did state that the medication had taken the edge off his anxiety. Pt states that he does have some racing thoughts.

## 2021-08-17 NOTE — BH NOTE
.Purposeful [facility] Round      Patient Location: Patient room    Patient willing to engage in conversation: No    Presentation/behavior: sleeping    Affect: sleeping    Concerns reported: earlier c/o anxiety    PRN medications given: Ativan 2 mg po given at 2130 which was effective    Environmental assessment: Room free from clutter, Clear path to bathroom  and Adequate lighting    Fall prevention interventions in place: n/a    Daily Miranda Fall Risk Score: 67    Daily Vivar Fall Risk Score: 0

## 2021-08-17 NOTE — BH NOTE
.Purposeful Rounding  For 2000 Round      Patient Location: Day room    Patient willing to engage in conversation: Yes    Presentation/behavior: Anxious, Guarded/Suspicious and Cooperative, generally    Affect: Labile and Restless, evasive    Concerns reported: none    PRN medications given: none    Environmental assessment: Room free from clutter, Clear path to bathroom  and Adequate lighting    Fall prevention interventions in place: n/a    Daily [place] Fall Risk Score: 67    Daily Vivar Fall Risk Score: 0

## 2021-08-17 NOTE — PLAN OF CARE
Problem: Altered Mood, Manic Behavior:  Goal: Ability to disclose and discuss suicidal ideas will improve  Description: Ability to disclose and discuss suicidal ideas will improve  Outcome: Ongoing     Problem: Altered Mood, Manic Behavior:  Goal: Able to verbalize decrease in frequency and intensity of racing thoughts  Description: Able to verbalize decrease in frequency and intensity of racing thoughts  Outcome: Ongoing   Patient has been disorganized and flight of ideas. Patient has been medication compliant started on depakote this shift. Patient denies SI/HI/AVH. Patient is preoccupied at times with some underline irritability. Will continue to monitor for safety.

## 2021-08-18 PROCEDURE — 6370000000 HC RX 637 (ALT 250 FOR IP): Performed by: PSYCHIATRY & NEUROLOGY

## 2021-08-18 PROCEDURE — 99233 SBSQ HOSP IP/OBS HIGH 50: CPT | Performed by: PSYCHIATRY & NEUROLOGY

## 2021-08-18 PROCEDURE — 1240000000 HC EMOTIONAL WELLNESS R&B

## 2021-08-18 RX ADMIN — LORAZEPAM 2 MG: 2 TABLET ORAL at 21:42

## 2021-08-18 RX ADMIN — ACETAMINOPHEN 650 MG: 325 TABLET ORAL at 20:24

## 2021-08-18 RX ADMIN — NICOTINE POLACRILEX 4 MG: 4 GUM, CHEWING BUCCAL at 16:32

## 2021-08-18 RX ADMIN — DIVALPROEX SODIUM 2000 MG: 500 TABLET, EXTENDED RELEASE ORAL at 09:40

## 2021-08-18 RX ADMIN — NICOTINE POLACRILEX 4 MG: 4 GUM, CHEWING BUCCAL at 12:39

## 2021-08-18 ASSESSMENT — PAIN DESCRIPTION - FREQUENCY: FREQUENCY: CONTINUOUS

## 2021-08-18 ASSESSMENT — PAIN DESCRIPTION - PROGRESSION: CLINICAL_PROGRESSION: NOT CHANGED

## 2021-08-18 ASSESSMENT — PAIN SCALES - GENERAL: PAINLEVEL_OUTOF10: 2

## 2021-08-18 ASSESSMENT — PAIN - FUNCTIONAL ASSESSMENT: PAIN_FUNCTIONAL_ASSESSMENT: PREVENTS OR INTERFERES SOME ACTIVE ACTIVITIES AND ADLS

## 2021-08-18 ASSESSMENT — PAIN DESCRIPTION - PAIN TYPE: TYPE: CHRONIC PAIN

## 2021-08-18 ASSESSMENT — PAIN DESCRIPTION - ORIENTATION: ORIENTATION: OTHER (COMMENT)

## 2021-08-18 ASSESSMENT — PAIN DESCRIPTION - LOCATION: LOCATION: GENERALIZED

## 2021-08-18 ASSESSMENT — PAIN DESCRIPTION - ONSET: ONSET: ON-GOING

## 2021-08-18 ASSESSMENT — PAIN DESCRIPTION - DESCRIPTORS: DESCRIPTORS: ACHING

## 2021-08-18 NOTE — GROUP NOTE
Group Therapy Note    Date: 8/17/2021    Group Start Time: 1955  Group End Time: 2020  Group Topic: [facility]        Group Therapy Note    Attendees: goals and importance of goal setting discussed. Night time milieu activities discussed. Patient's Goal:  Get thru the day    Notes:  Successful     Status After Intervention:  Improved    Participation Level:  Active Listener and Interactive    Participation Quality: Appropriate and Attentive      Speech:  normal      Thought Process/Content: Flight of ideas  Perseverating      Affective Functioning: Blunted      Mood: euthymic      Level of consciousness:  Oriented x4 and Preoccupied      Response to Learning: Progressing to goal      Endings: None Reported    Modes of Intervention: Support      Discipline Responsible: [address], [facility] [employer]      Signature:  Bigg Feliz

## 2021-08-18 NOTE — PLAN OF CARE
Problem: Altered Mood, Manic Behavior:  Goal: Able to verbalize decrease in frequency and intensity of racing thoughts  Description: Able to verbalize decrease in frequency and intensity of racing thoughts  Outcome: Ongoing   Patient did not show evidence of racing thoughts, he exhibited some delusional thinking. Patient only attended one group, took medications and was cooperative with staff. Visible on milieu in late afternoon.

## 2021-08-18 NOTE — BH NOTE
Purposeful Rounding  For 0000 rounds        Patient Location: Day room     Patient willing to engage in conversation: No. Pt in sleeping     Presentation/behavior: sleeping     Affect: constricted     Concerns reported: none     PRN medications given: Tylenol 650 mg for toe pain of a 2 at 2123 which pt said was effective. Also given Ativan 2 mg po at 2316. Pt was asleep by approximately midnight.     Environmental assessment: Room free from clutter, Clear path to bathroom  and Adequate lighting     Fall prevention interventions in place: adequate lighting.  Non-skid socks     Daily Miranda Fall Risk Score: 77     Daily Vivar Fall Risk Score: 0

## 2021-08-18 NOTE — BH NOTE
.Purposeful Rounding  For 2000 rounds      Patient Location: Day room    Patient willing to engage in conversation: No. Pt in group    Presentation/behavior: Guarded/Suspicious, Controlled and Cooperative    Affect: consstricted    Concerns reported: none    PRN medications given: none    Environmental assessment: Room free from clutter, Clear path to bathroom  and Adequate lighting    Fall prevention interventions in place: adequate lighting.  Non-skid socks    Daily Miranda Fall Risk Score: 77    Daily Vivar Fall Risk Score: 0

## 2021-08-18 NOTE — GROUP NOTE
Group Therapy Note    Date: 8/18/2021    Group Start Time: 3654  Group End Time: 2251  [facility] [address] [facility]        Group [facility] Note    Attendees: 6       Patient's Goal:  Patient will complete worksheet on Unresolved Co-Dependency and will discuss how symptoms effect mood and mental health. Notes:  Patient attended group. Completed the worksheet and discussed in group. Received feedback from peers. Status After Intervention:  Improved    Participation Level:  Active Listener and Interactive    Participation Quality: Appropriate and Attentive    Speech:  normal    Thought Process/Content: Logical    Affective Functioning: Congruent    Mood: anxious, depressed     Level of consciousness:  Oriented x4    Response to Learning: Able to verbalize current knowledge/experience and Able to verbalize/acknowledge new learning    Endings: None Reported    Modes of Intervention: Education, Support, Socialization and Exploration    Discipline Responsible: /Counselor    Signature:  Evert Daily [facility]

## 2021-08-18 NOTE — CARE COORDINATION
[facility]  Treatment Team Note  Review Date & Time: 08/18/21  0955    Patient was not in treatment team      Status EXAM:   Status and Exam  Normal: No  Facial Expression: Exaggerated  Affect: Constricted, Unstable  Level of Consciousness: Alert  Mood:Normal: No  Mood: Anxious, Labile, Suspicious, Irritable  Motor Activity:Normal: Yes  Motor Activity: Increased  Interview Behavior: Impulsive, Cooperative, Evasive  Preception: [place] to Person, Ronne Soja to Time, [place] to Place  Attention:Normal: No  Attention: Distractible  Thought Processes: Tangential  Thought Content:Normal: No  Thought Content: Preoccupations, Paranoia, Poverty of Content, Obsessions  Hallucinations: Other (Comment) (denies)  Delusions: Yes  Delusions: Grandeur  Memory:Normal:  (Odilon)  Memory:  (ODILON)  Insight and Judgment: No  Insight and Judgment: Poor Insight, Poor Judgment  Present Suicidal Ideation: No  Present Homicidal Ideation: No      Suicide Risk CSSR-S:  1) Within the past month, have you wished you were dead or wished you could go to sleep and not wake up? : No  2) Have you actually had any thoughts of killing yourself? : No  6) Have you ever done anything, started to do anything, or prepared to do anything to end your life?: No      PLAN/TREATMENT RECOMMENDATIONS UPDATE:   Patient will take medication as prescribed, eat 75% of meals, attend groups, participate in milieu activities, participate in treatment team and care planning for discharge and follow up.           Kirk Keith RN

## 2021-08-18 NOTE — PLAN OF CARE
Problem: Pain:  Goal: Pain level will decrease  Description: Pain level will decrease  Outcome: Ongoing     Problem: Altered Mood, Manic Behavior:  Goal: Able to verbalize decrease in frequency and intensity of racing thoughts  Description: Able to verbalize decrease in frequency and intensity of racing thoughts  8/18/2021 0012 by Ramiro Troncoso RN  Outcome: Ongoing     Problem: Altered Mood, Manic Behavior:  Goal: Ability to disclose and discuss suicidal ideas will improve  Description: Ability to disclose and discuss suicidal ideas will improve  8/18/2021 0012 by Ramiro Troncoso RN  Outcome: Ongoing     Problem: Altered Mood, Manic Behavior:  Goal: Ability to interact with others will improve  Description: Ability to interact with others will improve  Outcome: Ongoing     Problem: Altered Mood, Manic Behavior:  Goal: Mood stable  Description: Mood stable  Outcome: Ongoing   Pt has been out of his room more. He was in the dayroom and attended group. Pt is out on the unit more but still in not social with peers much. Pt denies psychotic sx as well as SI/HI. Pt does have some irritability. He got into a brief verbal altercation with a psychotic pt on the unit. Pt is grandiose, suspicious, and labile. Given Tylenol 650 mg po for toe pain of a 2 in his toe. Pt said that the Tylenol had been effective. Pt asked for Ativan tonight. Denies real anxiety, though he appears anxious. Pt had Ativan 2 mg po at 2316, Pt was asleep by approximately midnight.

## 2021-08-19 VITALS
SYSTOLIC BLOOD PRESSURE: 141 MMHG | RESPIRATION RATE: 16 BRPM | HEART RATE: 75 BPM | DIASTOLIC BLOOD PRESSURE: 79 MMHG | TEMPERATURE: 97.5 F | OXYGEN SATURATION: 98 %

## 2021-08-19 LAB
ALBUMIN SERPL-MCNC: 4.9 G/DL (ref 3.4–5)
ALP BLD-CCNC: 96 U/L (ref 40–129)
ALT SERPL-CCNC: 36 U/L (ref 10–40)
AMMONIA: 24 UMOL/L (ref 16–60)
AST SERPL-CCNC: 28 U/L (ref 15–37)
BILIRUB SERPL-MCNC: <0.2 MG/DL (ref 0–1)
BILIRUBIN DIRECT: <0.2 MG/DL (ref 0–0.3)
BILIRUBIN, INDIRECT: NORMAL MG/DL (ref 0–1)
TOTAL PROTEIN: 7.3 G/DL (ref 6.4–8.2)

## 2021-08-19 PROCEDURE — 5130000000 HC BRIDGE APPOINTMENT

## 2021-08-19 PROCEDURE — 6370000000 HC RX 637 (ALT 250 FOR IP): Performed by: PSYCHIATRY & NEUROLOGY

## 2021-08-19 PROCEDURE — 87390 HIV-1 AG IA: CPT

## 2021-08-19 PROCEDURE — 86701 HIV-1ANTIBODY: CPT

## 2021-08-19 PROCEDURE — 86702 HIV-2 ANTIBODY: CPT

## 2021-08-19 PROCEDURE — 99239 HOSP IP/OBS DSCHRG MGMT >30: CPT | Performed by: PSYCHIATRY & NEUROLOGY

## 2021-08-19 PROCEDURE — 80076 HEPATIC FUNCTION PANEL: CPT

## 2021-08-19 PROCEDURE — 82140 ASSAY OF AMMONIA: CPT

## 2021-08-19 PROCEDURE — 36415 COLL VENOUS BLD VENIPUNCTURE: CPT

## 2021-08-19 RX ORDER — LORAZEPAM 2 MG/1
2 TABLET ORAL ONCE
Status: COMPLETED | OUTPATIENT
Start: 2021-08-19 | End: 2021-08-19

## 2021-08-19 RX ORDER — NICOTINE 21 MG/24HR
1 PATCH, TRANSDERMAL 24 HOURS TRANSDERMAL DAILY
Qty: 15 PATCH | Refills: 0 | Status: SHIPPED | OUTPATIENT
Start: 2021-08-20 | End: 2021-09-04

## 2021-08-19 RX ORDER — DIVALPROEX SODIUM 500 MG/1
2000 TABLET, EXTENDED RELEASE ORAL DAILY
Qty: 120 TABLET | Refills: 0 | Status: SHIPPED | OUTPATIENT
Start: 2021-08-20 | End: 2021-08-19

## 2021-08-19 RX ORDER — DIVALPROEX SODIUM 500 MG/1
2000 TABLET, EXTENDED RELEASE ORAL DAILY
Qty: 120 TABLET | Refills: 0 | Status: SHIPPED | OUTPATIENT
Start: 2021-08-20 | End: 2021-09-19

## 2021-08-19 RX ADMIN — DIVALPROEX SODIUM 2000 MG: 500 TABLET, EXTENDED RELEASE ORAL at 07:54

## 2021-08-19 RX ADMIN — LORAZEPAM 2 MG: 2 TABLET ORAL at 10:24

## 2021-08-19 RX ADMIN — NICOTINE POLACRILEX 4 MG: 4 GUM, CHEWING BUCCAL at 12:50

## 2021-08-19 NOTE — PROGRESS NOTES
[facility]  Discharge Note    Pt discharged with followings belongings:   Dentures: None  Vision - Corrective Lenses: None  Hearing Aid: None  Jewelry: None  Body Piercings Removed: No  Clothing: Shirt, Footwear, Pants, Undergarments (Comment)  Other Valuables: Other (Comment) (Wallet; dip; lighter in safe)   Valuables sent home with pt or returned to patient. Patient education on aftercare instructions: yes  Information faxed to [facility] by Linh Greer  at 4:09 PM .Patient verbalize understanding of AVS:  yes. Status EXAM upon discharge:  Status and Exam  Normal: Yes  Facial Expression: Exaggerated  Affect: Blunt, Stable  Level of Consciousness: Alert  Mood:Normal: No  Mood: Suspicious  Motor Activity:Normal: Yes  Motor Activity: Decreased  Interview Behavior: Cooperative  Preception: [place] to Person, Vista to Time, [place] to Place, Vista to Situation  Attention:Normal: Yes  Attention: Distractible  Thought Processes: Loose Assoc. Thought Content:Normal: No  Thought Content: Paranoia  Hallucinations: None  Delusions: No  Delusions: Grandeur  Memory:Normal: Yes  Memory:  (ODILON- limited verbalization)  Insight and Judgment: No  Insight and Judgment: Poor Judgment, Poor Insight  Present Suicidal Ideation: No  Present Homicidal Ideation: No      Metabolic Screening:    No results found for: LABA1C    No results found for: CHOL  No results found for: TRIG  No results found for: HDL  No components found for: LDLCAL  No results found for: Iqra Bond, [facility] Appointment completed: Reviewed Discharge Instructions with patient. Patient verbalizes understanding and agreement with the discharge plan using the teachback method.      Referral for Outpatient Tobacco Cessation Counseling, upon discharge (evangelina X if applicable and completed):    ( )  Hospital staff assisted patient to call Quit Line or faxed referral                                   during

## 2021-08-19 NOTE — PROGRESS NOTES
Purposeful Rounding    Patient Location: Patient room    Patient willing to engage in conversation: No    Presentation/behavior: Other Asleep*    Affect: Neutral/Euthymic(normal)    Concerns reported: No    PRN medications given: No    Environmental assessment: Room free from clutter, Clear path to bathroom  and No safety hazards noted    Fall prevention interventions in place: N/A    Daily [place] Fall Risk Score: 69    Daily Vivar Fall Risk Score: 0

## 2021-08-19 NOTE — SUICIDE SAFETY PLAN
SAFETY PLAN    A suicide Safety Plan is a document that supports someone when they are having thoughts of suicide. Warning Signs that indicate a suicidal crisis may be developing: What (situations, thoughts, feelings, body sensations, behaviors, etc.) do you experience that lets you know you are beginning to think about suicide? 1. anger  2. paranoia    Internal Coping Strategies:  What things can I do (relaxation techniques, hobbies, physical activities, etc.) to take my mind off my problems without contacting another person? 1. painting  2. Reading   3. golf    People and social settings that provide distraction: Who can I call or where can I go to distract me? Outdoors, hunting, golf course, fishing, water    People whom I can ask for help: Who can I call when I need help - for example, friends, family, clergy, someone else? 1. Name: Zoraida Ceja                 2. Name: Renée Pineda    3. Name: [address] or Behavioral Health agencies I can contact during a crisis: Who can I call for help - for example, my doctor, my psychiatrist, my psychologist, a mental health provider, a suicide hotline? 1. Clinician Name: [address] [place] [address]   Address: [address] [place], [facility]      Phone  #: [phone]    2. Suicide Prevention Lifeline: 6-179-682-EZAK (5932)        Making the environment safe: How can I make my environment (house/apartment/living space) safer? For example, can I remove guns, medications, and other items? 1. \"none\" home is safe  2.  Needs cleaned    Something that is important to me and worth living for is: my dog, my children, my legacy

## 2021-08-19 NOTE — PROGRESS NOTES
Department of Psychiatry  Progress Note    Patient's chart was reviewed. Discussed with treatment team. Met with patient. SUBJECTIVE:      Less elevated and expansive today. Behaviorally stable. Seem to be tolerating Depakote well so far; however, is reluctant to get blood drawn here. Is willing to have his blood drawn by his PCP. ROS:   Patient has new complaints: no  Sleeping adequately:  No:   Appetite adequate: Yes  Engaged in programming: some    OBJECTIVE:  VITALS:  BP (!) 141/79   Pulse 75   Temp 97.5 °F (36.4 °C) (Temporal)   Resp 16   SpO2 98%     Mental Status Examination:    Appearance: good grooming and hygiene  Behavior/Attitude toward examiner:  good eye contact  Speech: pressured  Mood:  \"great\"  Affect:  less elevated; less labile. Thought processes:  tangential  Thought Content: no SI, no HI, grandiose, some paranoia  Perceptions: no AVH  Attention: attention span and concentration were intact to interview   Abstraction: intact  Cognition:  Alert and oriented to person, place, time, and situation, recall intact  Insight: fair  Judgment: improving     Medication:  Scheduled:   divalproex  2,000 mg Oral Daily    nicotine  1 patch Transdermal Daily        PRN:  medicated lip balm, acetaminophen, ibuprofen, LORazepam, OLANZapine, OLANZapine, nicotine polacrilex     FORMULATION:  This is a domiciled, , employed 68-year-old with a  history of bipolar 1 disorder who was brought by police on a statement  of belief to [facility] ED with manic symptoms and behavior. The  patient presents pressured, tangential, grandiose, and elevated. He  meets criteria for bipolar 1 disorder, most recently manic with  psychotic features. Given the severity of his symptoms, he was admitted  for further evaluation and treatment.     DIAGNOSES:  1. Bipolar 1 disorder, severe, most recently manic with psychotic  features. 2.  Right ankle and toe pain. PLAN:  1.   Admitted to Inpatient Psychiatry for stabilization and treatment. 2.  On admission, discussed treatment options at length. He was not  interested in scheduled psychotropics at this point. Ordered q.  15-minute checks for safety, programming, and p.r.n. medication for  anxiety, agitation, and insomnia. 8/17/2021 - Discussed treatment options (mood stabilizers and antipsychotics) again and finally agreed to start Depakote - loading dose - for acute stabilization and maintenance. 8/18/2021 - continue Depakote ER 2000mg daily. 3.  Internal Medicine consult for admission. #Right third toe contusion  - xray neg for fx. He denies complaints and declined further exam    4. Estimated length of stay, 5 to 8 days for stabilization. The patient was admitted on a statement of belief but agreed  to stay voluntarily for a short period for stabilization. Requested DC today but eventually agreed to stay one more day. Total time with patient was 35 minutes and more than 50 % of that time was spent counseling the patient on their symptoms, treatment, and expected goals.      Windy Araujo MD

## 2021-08-19 NOTE — FLOWSHEET NOTE
Group Therapy Note    Date: 8/19/2021  Start Time: 1300  End Time:  1400  Number of Participants: 10    Type of Group: Music Group    Notes:  Pt present for Music Group. Pt participated by making song selections. Participation Level:  Active Listener and Interactive    Participation Quality: Appropriate and Attentive      Speech:  normal      Affective Functioning: Congruent      Endings: None Reported    Modes of Intervention: Support, Socialization, Activity and Media      Discipline Responsible: Chaplain Celina Jacinto       08/19/21 1522   Encounter Summary   Services provided to: Patient   Continue Visiting   (8/19 Music Group)   Complexity of Encounter Moderate   Length of Encounter 1 hour

## 2021-08-19 NOTE — GROUP NOTE
Group Therapy Note    Date: 8/18/2021    Group Start Time: 2000  Group End Time: 2020  Group Topic: [address] [facility]        Group Therapy Note    Attendees: Goals and importance of goal setting discussed. Night time milieu activities discussed. Patient's Goal:  Get through another day, get reoriented to new surroundings    Notes:  Successful     Status After Intervention:  Improved    Participation Level:  Active Listener and Interactive    Participation Quality: Appropriate and Attentive      Speech:  normal      Thought Process/Content: Perseverating      Affective Functioning: Constricted/Restricted      Mood: euthymic      Level of consciousness:  Preoccupied      Response to Learning: Progressing to goal      Endings: None Reported    Modes of Intervention: Support      Discipline Responsible: [address], [facility] [employer]      Signature:  Wilfredo Mejia

## 2021-08-19 NOTE — PROGRESS NOTES
Purposeful Rounding    Patient Location: Day room    Patient willing to engage in conversation: Yes    Presentation/behavior: Anxious and Guarded/Suspicious    Affect: Anxious    Concerns reported: Requested tylenol for pain    PRN medications given: Tylenol 650 mg given po    Environmental assessment: Room free from clutter, Clear path to bathroom  and No safety hazards noted    Fall prevention interventions in place: N/A    Daily [place] Fall Risk Score:  69    Daily Vivar Fall Risk Score: 0

## 2021-08-20 LAB
HIV AG/AB: NORMAL
HIV ANTIGEN: NORMAL
HIV-1 ANTIBODY: NORMAL
HIV-2 AB: NORMAL
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
